# Patient Record
Sex: FEMALE | Race: WHITE | NOT HISPANIC OR LATINO | Employment: FULL TIME | URBAN - METROPOLITAN AREA
[De-identification: names, ages, dates, MRNs, and addresses within clinical notes are randomized per-mention and may not be internally consistent; named-entity substitution may affect disease eponyms.]

---

## 2019-05-08 ENCOUNTER — TRANSCRIBE ORDERS (OUTPATIENT)
Dept: ADMINISTRATIVE | Facility: HOSPITAL | Age: 54
End: 2019-05-08

## 2019-05-08 DIAGNOSIS — G63 POLYNEUROPATHY IN OTHER DISEASES CLASSIFIED ELSEWHERE (HCC): Primary | ICD-10-CM

## 2019-06-14 ENCOUNTER — HOSPITAL ENCOUNTER (OUTPATIENT)
Dept: NEUROLOGY | Facility: HOSPITAL | Age: 54
Discharge: HOME | End: 2019-06-14
Admitting: PHYSICIAN ASSISTANT

## 2019-06-14 DIAGNOSIS — G63 POLYNEUROPATHY IN OTHER DISEASES CLASSIFIED ELSEWHERE (HCC): ICD-10-CM

## 2019-06-14 PROCEDURE — 95886 MUSC TEST DONE W/N TEST COMP: CPT

## 2019-06-14 PROCEDURE — 95911 NRV CNDJ TEST 9-10 STUDIES: CPT

## 2020-11-25 ENCOUNTER — TELEPHONE (OUTPATIENT)
Dept: NEUROLOGY | Facility: CLINIC | Age: 55
End: 2020-11-25

## 2020-11-25 NOTE — TELEPHONE ENCOUNTER
PT CURRENTLY HAS A REFERRAL FROM THE 19TH. SHE CALLED TO CHECK STATUS. PT STATES THAT SHES JUST TRYING TO GET SOMEONE TO GIVE HER THE PAPERWORK FOR HER TO BE ABLE TO DRIVE AGAIN. PLEASE REVIEW AND ADVISE.

## 2021-04-21 ENCOUNTER — OFFICE VISIT (OUTPATIENT)
Dept: NEUROLOGY | Facility: CLINIC | Age: 56
End: 2021-04-21

## 2021-04-21 VITALS
HEART RATE: 70 BPM | WEIGHT: 157.2 LBS | DIASTOLIC BLOOD PRESSURE: 88 MMHG | SYSTOLIC BLOOD PRESSURE: 138 MMHG | OXYGEN SATURATION: 97 % | HEIGHT: 66 IN | TEMPERATURE: 96.9 F | BODY MASS INDEX: 25.26 KG/M2

## 2021-04-21 DIAGNOSIS — F17.200 SMOKER: ICD-10-CM

## 2021-04-21 DIAGNOSIS — G93.40 ENCEPHALOPATHY: Primary | ICD-10-CM

## 2021-04-21 PROCEDURE — 99204 OFFICE O/P NEW MOD 45 MIN: CPT | Performed by: PSYCHIATRY & NEUROLOGY

## 2021-04-21 PROCEDURE — 99406 BEHAV CHNG SMOKING 3-10 MIN: CPT | Performed by: PSYCHIATRY & NEUROLOGY

## 2021-04-21 RX ORDER — ROPINIROLE 3 MG/1
3 TABLET, FILM COATED ORAL
COMMUNITY
Start: 2020-10-29

## 2021-04-21 RX ORDER — LISINOPRIL 5 MG/1
TABLET ORAL
COMMUNITY
Start: 2021-03-19

## 2021-04-21 RX ORDER — SIMVASTATIN 40 MG
TABLET ORAL
COMMUNITY
Start: 2021-03-01

## 2021-04-21 RX ORDER — ASPIRIN 81 MG/1
TABLET, COATED ORAL
COMMUNITY
Start: 2021-03-02

## 2021-04-21 RX ORDER — NICOTINE 21 MG/24HR
PATCH, TRANSDERMAL 24 HOURS TRANSDERMAL
Qty: 45 PATCH | Refills: 0 | Status: SHIPPED | OUTPATIENT
Start: 2021-04-21

## 2021-04-21 RX ORDER — CLOPIDOGREL BISULFATE 75 MG/1
TABLET ORAL
COMMUNITY
Start: 2021-03-01

## 2021-04-21 RX ORDER — ASPIRIN 81 MG/1
81 TABLET, CHEWABLE ORAL
COMMUNITY
Start: 2020-11-04 | End: 2021-04-21

## 2021-04-21 RX ORDER — CARVEDILOL 6.25 MG/1
TABLET ORAL
COMMUNITY
Start: 2021-03-02

## 2021-04-21 NOTE — PROGRESS NOTES
Subjective:    CC: Divina Pisano is seen today in consultation at the request of REY Palomo for memory problems    HPI:  55-year-old female with a past medical history of CAD status post multiple stents, hypertension, hyperlipidemia, mild neuropathy, chronic smoker presents with a diagnosis of encephalopathy.  As per history patient was admitted to [facility] in October with a STEMI.  She underwent a cardiac catheterization and stent placements but the procedure was complicated by an acute retroperitoneal bleed.  She became extremely hypotensive and also developed a fever at that time.  Was intubated for airway protection and started on pressors.  The hospital stay was further complicated by colitis and aspiration pneumonia.  Even after she was extubated patient remained extremely confused and it was felt she could have had hypoxic encephalopathy versus watershed infarcts however MRI brain done at that time did not show any acute intracranial abnormalities, just mild atrophy with chronic ischemic changes.  EEG was also done at the time and showed background suppression with slowing but no epileptiform activity.  After discharge patient went to [facility] and then got outpatient rehab.  She states that she does not remember her hospital stay as well as parts of her stay in [facility] but otherwise has no difficulty remembering things.  She lives at home with her  and is able to carry out all her activities of daily living including cooking and driving.  She was initially also having mood problems and was started on Nuedexta by her PCP for pseudobulbar affect but she stopped taking it.  Denies any symptoms of depression now.  Of note-I personally reviewed her hospital records    The following portions of the patient's history were reviewed today and updated as of 04/21/2021  : allergies, current medications, past family history, past medical history, past social history, past  "surgical history and problem list  These document will be scanned to patient's chart.      Current Outpatient Medications:   •  carvedilol (COREG) 6.25 MG tablet, , Disp: , Rfl:   •  clopidogrel (PLAVIX) 75 MG tablet, , Disp: , Rfl:   •  lisinopril (PRINIVIL,ZESTRIL) 5 MG tablet, TAKE 1 TABLET BY MOUTH ONE TIME EVERY DAY, Disp: , Rfl:   •  rOPINIRole (REQUIP) 3 MG tablet, 3 mg., Disp: , Rfl:   •  simvastatin (ZOCOR) 40 MG tablet, , Disp: , Rfl:   •  Aspirin Low Dose 81 MG EC tablet, , Disp: , Rfl:    Past Medical History:   Diagnosis Date   • Anxiety    • Arthritis    • COPD (chronic obstructive pulmonary disease) (CMS/HCC)    • Heart disease    • Hypertension    • Neuropathy       Past Surgical History:   Procedure Laterality Date   • CORONARY STENT PLACEMENT        Family History   Problem Relation Age of Onset   • Lung cancer Mother    • Colon cancer Father       Social History     Socioeconomic History   • Marital status:      Spouse name: Not on file   • Number of children: Not on file   • Years of education: Not on file   • Highest education level: Not on file   Tobacco Use   • Smoking status: Current Every Day Smoker   Substance and Sexual Activity   • Drug use: Never     Review of Systems   All other systems reviewed and are negative.      Objective:    /88   Pulse 70   Temp 96.9 °F (36.1 °C)   Ht 167.6 cm (66\")   Wt 71.3 kg (157 lb 3.2 oz)   SpO2 97%   BMI 25.37 kg/m²     Neurology Exam:    General apperance: NAD.     Mental status: Alert, awake and oriented to time place and person.    Recent and Remote memory: Intact.  MMSE of 29/30 with a delayed recall of 3/3    Attention span and Concentration: Normal.     Language and Speech: Intact- No dysarthria.    Fluency, Naming , Repitition and Comprehension:  Intact    Cranial Nerves:   CN II: Visual fields are full. Intact. Fundi - Normal, No papillederma, Pupils - NAYELI  CN III, IV and VI: Extraocular movements are intact. Normal saccades. "   CN V: Facial sensation is intact.   CN VII: Muscles of facial expression reveal no asymmetry. Intact.   CN VIII: Hearing is intact. Whispered voice intact.   CN IX and X: Palate elevates symmetrically. Intact  CN XI: Shoulder shrug is intact.   CN XII: Tongue is midline without evidence of atrophy or fasciculation.     Ophthalmoscopic exam of optic disc-normal    Motor:  Right UE muscle strength 5/5. Normal tone.     Left UE muscle strength 5/5. Normal tone.      Right LE muscle strength5/5. Normal tone.     Left LE muscle strength 5/5. Normal tone.      Sensory: Normal light touch, vibration and pinprick sensation bilaterally.    DTRs: 2+ bilaterally in upper and lower extremities.    Babinski: Negative bilaterally.    Co-ordination: Normal finger-to-nose, heel to shin B/L.    Rhomberg: Negative.    Gait: Normal.    Cardiovascular: Regular rate and rhythm without murmur, gallop or rub.    Assessment and Plan:  1. Encephalopathy  I feel patient's memory problems during her hospitalization was due to hypoxic encephalopathy.  MRI brain did not rule out a stroke  She denies having any difficulties remembering now hence I will defer starting her on medications  I have told her to start taking B12 supplements  I have also counseled her to carry on physical and mental exercises such as reading, solving crossword puzzles etc. some of which she is already doing    2.  Smoker  Patient is currently smoking about 5-6 cigarettes a day.  I have told her to quit smoking.  I will give her prescriptions for nicotine patch  Counseling given for over 3 minutes    Return if symptoms worsen or fail to improve.     I spent over 45 minutes with the patient face to face out of which over 50% (30 minutes) was spent in management, instructions and education.     Franny Soliz MD